# Patient Record
Sex: MALE | Race: BLACK OR AFRICAN AMERICAN | NOT HISPANIC OR LATINO | URBAN - METROPOLITAN AREA
[De-identification: names, ages, dates, MRNs, and addresses within clinical notes are randomized per-mention and may not be internally consistent; named-entity substitution may affect disease eponyms.]

---

## 2024-04-25 ENCOUNTER — EMERGENCY (EMERGENCY)
Facility: HOSPITAL | Age: 44
LOS: 0 days | Discharge: HOME | End: 2024-04-25
Attending: EMERGENCY MEDICINE
Payer: MEDICAID

## 2024-04-25 VITALS
SYSTOLIC BLOOD PRESSURE: 120 MMHG | OXYGEN SATURATION: 98 % | HEART RATE: 75 BPM | TEMPERATURE: 98 F | DIASTOLIC BLOOD PRESSURE: 80 MMHG | RESPIRATION RATE: 18 BRPM

## 2024-04-25 VITALS
WEIGHT: 134.92 LBS | HEART RATE: 82 BPM | RESPIRATION RATE: 19 BRPM | HEIGHT: 71 IN | SYSTOLIC BLOOD PRESSURE: 107 MMHG | TEMPERATURE: 98 F | DIASTOLIC BLOOD PRESSURE: 79 MMHG | OXYGEN SATURATION: 96 %

## 2024-04-25 DIAGNOSIS — M54.42 LUMBAGO WITH SCIATICA, LEFT SIDE: ICD-10-CM

## 2024-04-25 DIAGNOSIS — M54.50 LOW BACK PAIN, UNSPECIFIED: ICD-10-CM

## 2024-04-25 DIAGNOSIS — Z87.828 PERSONAL HISTORY OF OTHER (HEALED) PHYSICAL INJURY AND TRAUMA: Chronic | ICD-10-CM

## 2024-04-25 PROCEDURE — 99284 EMERGENCY DEPT VISIT MOD MDM: CPT

## 2024-04-25 RX ORDER — ACETAMINOPHEN 500 MG
975 TABLET ORAL ONCE
Refills: 0 | Status: COMPLETED | OUTPATIENT
Start: 2024-04-25 | End: 2024-04-25

## 2024-04-25 RX ORDER — METHOCARBAMOL 500 MG/1
1 TABLET, FILM COATED ORAL
Qty: 20 | Refills: 0
Start: 2024-04-25

## 2024-04-25 RX ORDER — METHOCARBAMOL 500 MG/1
750 TABLET, FILM COATED ORAL ONCE
Refills: 0 | Status: COMPLETED | OUTPATIENT
Start: 2024-04-25 | End: 2024-04-25

## 2024-04-25 RX ORDER — IBUPROFEN 200 MG
600 TABLET ORAL ONCE
Refills: 0 | Status: COMPLETED | OUTPATIENT
Start: 2024-04-25 | End: 2024-04-25

## 2024-04-25 RX ORDER — LIDOCAINE 4 G/100G
1 CREAM TOPICAL ONCE
Refills: 0 | Status: COMPLETED | OUTPATIENT
Start: 2024-04-25 | End: 2024-04-25

## 2024-04-25 RX ADMIN — LIDOCAINE 1 PATCH: 4 CREAM TOPICAL at 19:07

## 2024-04-25 RX ADMIN — METHOCARBAMOL 750 MILLIGRAM(S): 500 TABLET, FILM COATED ORAL at 19:07

## 2024-04-25 RX ADMIN — Medication 600 MILLIGRAM(S): at 19:07

## 2024-04-25 RX ADMIN — Medication 975 MILLIGRAM(S): at 19:06

## 2024-04-25 NOTE — ED ADULT TRIAGE NOTE - CHIEF COMPLAINT QUOTE
Pt c/o lower back pain x 1 week. pt stated, pt worsen with movements/ positioning, also states he is unable to get in and out of bed, or bend over and  anything. Pt denies PMH

## 2024-04-25 NOTE — ED PROVIDER NOTE - CLINICAL SUMMARY MEDICAL DECISION MAKING FREE TEXT BOX
Attending note (Steven): 43-year-old male presenting with atraumatic back pain for approximately 1 week.  No signs or symptoms that would suggest cauda equina or acute cord compression.  Positive left straight leg raise suspecting possible sciatica versus muscular back pain/spasm.  Treat initially with Tylenol NSAIDs lidocaine patch and muscle relaxing medication.  If some improvement anticipate discharge recommendations for follow-up through spine center/[facility].

## 2024-04-25 NOTE — ED PROVIDER NOTE - PATIENT PORTAL LINK FT
You can access the FollowMyHealth Patient Portal offered by [facility] by registering at the following website: [email or web address]. By joining [employer]’s FollowMyHealth portal, you will also be able to view your health information using other applications (apps) compatible with our system.

## 2024-04-25 NOTE — ED PROVIDER NOTE - NSFOLLOWUPINSTRUCTIONS_ED_ALL_ED_FT
Back Pain    Back pain is very common in adults. The cause of back pain is rarely dangerous and the pain often gets better over time. The cause of your back pain may not be known and may include strain of muscles or ligaments, degeneration of the spinal disks, or arthritis. Occasionally the pain may radiate down your leg(s). Over-the-counter medicines to reduce pain and inflammation are often the most helpful. Stretching and remaining active frequently helps the healing process.     SEEK IMMEDIATE MEDICAL CARE IF YOU HAVE ANY OF THE FOLLOWING SYMPTOMS: bowel or bladder control problems, unusual weakness or numbness in your arms or legs, nausea or vomiting, abdominal pain, fever, dizziness/lightheadedness.     Take ibuprofen 400mg every 6 hours as needed for pain.   Take Methocarbamol (750mg) 1 tablet up to every 8 hours as needed for pain/spasms.    Call for an appointment: [facility]  [phone]

## 2024-04-25 NOTE — ED PROVIDER NOTE - PHYSICAL EXAMINATION
On Physical Exam:  General: well appearing, in NAD, speaking clearly in full sentences and without difficulty; cooperative with exam  HEENT: anicteric sclera, airway patent  Neck: no neck tenderness, no nuchal rigidity  Abdomen: soft nontender/nondistended  : no bladder tenderness or distension  Back: no cva tenderness bilaterally, no midline t/l spine tenderness  Skin: intact, no rash  Extremities: no peripheral edema, no gross deformities  Neuro: no gross neurologic deficits: 5/5 str hip flexion/extension bilaterally, 5/5 knee flexion/extension bilaterally, 5/5 ankle dorsiflexion/plantarflexion bilaterally; sensation intact throughout LE bilaterally specifically no saddle anesthesia; + left straight leg raise

## 2024-04-25 NOTE — ED ADULT NURSE NOTE - OBJECTIVE STATEMENT
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

## 2024-04-25 NOTE — ED PROVIDER NOTE - NS ED ROS FT
Review of Systems:  -General: no fever   -Gastrointestinal: no abdominal pain, no nausea, no vomiting, and no diarrhea.  -Genitourinary: no urinary incontinence  -Musculoskeletal: +back pain  -Skin: no rashes  -Endocrine: No h/o diabetes   -Neurologic: No new weakness or numbness in extremities
